# Patient Record
Sex: FEMALE | Race: WHITE | Employment: OTHER | URBAN - METROPOLITAN AREA
[De-identification: names, ages, dates, MRNs, and addresses within clinical notes are randomized per-mention and may not be internally consistent; named-entity substitution may affect disease eponyms.]

---

## 2017-03-18 PROBLEM — Z34.00 SUPERVISION OF NORMAL FIRST PREGNANCY, ANTEPARTUM: Status: ACTIVE | Noted: 2017-03-18

## 2017-03-18 PROBLEM — Z34.00 SUPERVISION OF NORMAL FIRST PREGNANCY, ANTEPARTUM (HCC): Status: ACTIVE | Noted: 2017-03-18

## 2017-03-21 PROCEDURE — 87591 N.GONORRHOEAE DNA AMP PROB: CPT | Performed by: OBSTETRICS & GYNECOLOGY

## 2017-03-21 PROCEDURE — 88175 CYTOPATH C/V AUTO FLUID REDO: CPT | Performed by: OBSTETRICS & GYNECOLOGY

## 2017-03-21 PROCEDURE — 87086 URINE CULTURE/COLONY COUNT: CPT | Performed by: OBSTETRICS & GYNECOLOGY

## 2017-03-21 PROCEDURE — 87491 CHLMYD TRACH DNA AMP PROBE: CPT | Performed by: OBSTETRICS & GYNECOLOGY

## 2017-03-23 PROCEDURE — 86762 RUBELLA ANTIBODY: CPT | Performed by: OBSTETRICS & GYNECOLOGY

## 2017-03-23 PROCEDURE — 86780 TREPONEMA PALLIDUM: CPT | Performed by: OBSTETRICS & GYNECOLOGY

## 2017-03-23 PROCEDURE — 36415 COLL VENOUS BLD VENIPUNCTURE: CPT | Performed by: OBSTETRICS & GYNECOLOGY

## 2017-03-23 PROCEDURE — 85025 COMPLETE CBC W/AUTO DIFF WBC: CPT | Performed by: OBSTETRICS & GYNECOLOGY

## 2017-03-23 PROCEDURE — 86850 RBC ANTIBODY SCREEN: CPT | Performed by: OBSTETRICS & GYNECOLOGY

## 2017-03-23 PROCEDURE — 87340 HEPATITIS B SURFACE AG IA: CPT | Performed by: OBSTETRICS & GYNECOLOGY

## 2017-03-23 PROCEDURE — 87389 HIV-1 AG W/HIV-1&-2 AB AG IA: CPT | Performed by: OBSTETRICS & GYNECOLOGY

## 2017-03-23 PROCEDURE — 86901 BLOOD TYPING SEROLOGIC RH(D): CPT | Performed by: OBSTETRICS & GYNECOLOGY

## 2017-03-23 PROCEDURE — 86900 BLOOD TYPING SEROLOGIC ABO: CPT | Performed by: OBSTETRICS & GYNECOLOGY

## 2017-04-17 PROBLEM — G43.701 CHRONIC MIGRAINE WITHOUT AURA WITH STATUS MIGRAINOSUS, NOT INTRACTABLE: Status: ACTIVE | Noted: 2017-04-17

## 2024-09-03 ENCOUNTER — APPOINTMENT (OUTPATIENT)
Dept: GENERAL RADIOLOGY | Age: 34
End: 2024-09-03
Attending: NURSE PRACTITIONER
Payer: COMMERCIAL

## 2024-09-03 ENCOUNTER — HOSPITAL ENCOUNTER (OUTPATIENT)
Age: 34
Discharge: HOME | End: 2024-09-03
Payer: COMMERCIAL

## 2024-09-03 VITALS
OXYGEN SATURATION: 100 % | RESPIRATION RATE: 18 BRPM | DIASTOLIC BLOOD PRESSURE: 89 MMHG | HEART RATE: 68 BPM | SYSTOLIC BLOOD PRESSURE: 132 MMHG | TEMPERATURE: 98 F

## 2024-09-03 DIAGNOSIS — M79.89 TOE SWELLING: Primary | ICD-10-CM

## 2024-09-03 PROCEDURE — 99203 OFFICE O/P NEW LOW 30 MIN: CPT | Performed by: NURSE PRACTITIONER

## 2024-09-03 PROCEDURE — 73660 X-RAY EXAM OF TOE(S): CPT | Performed by: NURSE PRACTITIONER

## 2024-09-03 RX ORDER — CEFADROXIL 500 MG/1
500 CAPSULE ORAL 2 TIMES DAILY
Qty: 14 CAPSULE | Refills: 0 | Status: SHIPPED | OUTPATIENT
Start: 2024-09-03 | End: 2024-09-10

## 2024-09-03 RX ORDER — SACCHAROMYCES BOULARDII 250 MG
250 CAPSULE ORAL 2 TIMES DAILY
Qty: 20 CAPSULE | Refills: 0 | Status: SHIPPED | OUTPATIENT
Start: 2024-09-03 | End: 2024-09-13

## 2024-09-03 NOTE — DISCHARGE INSTRUCTIONS
Take the full course of antibiotics, even if you start to feel better. Make an appointment to follow up with your primary care doctor or podiatrist in the next few days for a wound check. Use warm soaks/moist compresses and massage to drain area further. Good hand washing is important and anything that drains from the area should be considered contagious. Seek additional care in the emergency department if you have increasing redness, warmth or tenderness around your wound, fever > 100.4, nausea/vomiting or diarrhea, or for any other concerns.

## 2024-09-03 NOTE — ED INITIAL ASSESSMENT (HPI)
Pt c/o itchy bump with redness and swelling to bottom of L 3rd toe x3 days.  No pain.  [place]

## 2024-09-03 NOTE — ED PROVIDER NOTES
Patient Seen in: [facility]    History   CC: toe swelling  HPI: Leigh Franco 33 year old female  who presents c/o left 3rd toe swelling, redness and itching which first began 3 days ago. Denies known injury/trauma. States this occurred after [place]foot [place] - unsure if she stepped on something or if fb possible. Denies pain associated. States swelling has been worsening instead of improving. Denies dx, fever, rash, limitation in ROM. Denies foot or other toe involvement.     Past Medical History:    Gastrointestinal disorder    WAS TREATED FOR C. DIFFICILE    Genitourinary disease    HX UTIs    Headache    HX MIGRAINE HAs       Past Surgical History:   Procedure Laterality Date    Other  2000    plastic surgery in ears     Other surgical history  2000    PLASTIC SURGERY ON HER EARS       Family History   Problem Relation Age of Onset    Hypertension Father        Social History     Socioeconomic History    Marital status:    Tobacco Use    Smoking status: Never    Smokeless tobacco: Never   Vaping Use    Vaping status: Never Used   Substance and Sexual Activity    Alcohol use: No     Alcohol/week: 0.0 standard drinks of alcohol    Drug use: Never       ROS:  Review of Systems    Positive for stated complaint: toe problem  Other systems are as noted in HPI.  Constitutional and vital signs reviewed.      All other systems reviewed and negative except as noted above.    PSFH elements reviewed from today and agreed except as otherwise stated in HPI.             Constitutional and vital signs reviewed.        Physical Exam     ED Triage Vitals   BP 09/03/24 0839 132/89   Pulse 09/03/24 0839 68   Resp 09/03/24 0839 18   Temp 09/03/24 0839 97.7 °F (36.5 °C)   Temp src 09/03/24 0839 Temporal   SpO2 09/03/24 0839 100 %   O2 Device 09/03/24 0837 None (Room air)       Current:/89   Pulse 68   Temp 97.7 °F (36.5 °C) (Temporal)   Resp 18   LMP 08/15/2024 (Approximate)   SpO2 100%          PE:  General - Appears well, non-toxic and in NAD  Head - Appears symmetrical without deformity/swelling cranium, scalp, or facial bones  Skin - +diffuse mild edema noted to left 3rd toe with erythema noted to medial and plantar aspects. There is a punctate central area that could be consistent with insect bite/sting. No area of fluctuance or dx. Skin otherwise pink warm and dry throughout, mmm, cap refill <2seconds  Neuro - A&O x4, sensation equal to both medial and lateral aspects of lower extremities, steady gait  MSK - makes purposeful movements of lower extremities with full ROM noted, toe flexion/extension strength equal bilat, pedal pulses 2+ bilat.  Psych - Interactive and appropriate      ED Course   Labs Reviewed - No data to display    MDM     XR TOE(S) (MIN 2 VIEWS), LEFT 3RD (CPT=73660) (Final result)  Result time 09/03/24 08:58:21  Final result by Bryon Frazier MD (09/03/24 08:58:21)                Impression:    CONCLUSION:  1. No acute appearing fracture or dislocation.  Mild soft tissue swelling of the left 3rd toe at its mid to distal aspect.  Correlate clinically.           Dictated by (CST): Bryon Frazier MD on 9/03/2024 at 8:56 AM      Finalized by (CST): Bryon Frazier MD on 9/03/2024 at 8:58 AM                  Narrative:    PROCEDURE: XR TOE(S) (MIN 2 VIEWS), LEFT 3RD (CPT=73660)     COMPARISON: None.     INDICATIONS: Left 3rd toe swelling and redness without known injury/trauma x 3 days.  R/o fb.     TECHNIQUE: 3 views were obtained.       FINDINGS:  BONES: Normal. No significant arthropathy, fracture or acute abnormality.  SOFT TISSUES: Mild soft tissue swelling of the left 3rd toe at its mid to distal aspect.  Correlate clinically.  EFFUSION: None visible.  OTHER: Negative.              DDx: Inflammatory reaction from insect bite/sting, cellulitis, foreign body    X-ray results as noted above and independently reviewed by this provider.  Discussed with patient as well as likely  insect bite/sting localized inflammatory reaction as there is no pain associated however redness and swelling have progressed over the last 24 hours, therefore we will cover empirically for possible infectious process/cellulitis.  Antibiotic instructions, warm/moist compresses or soaks, follow-up and return/ED precautions reviewed.  Patient is historian and demonstrates understanding of all instruction and agrees with plan of care.      Disposition and Plan     Clinical Impression:  1. Toe swelling        Disposition:  Discharge    Follow-up:  Chuyita Shah, ALEXSANDRA  [address] [address]  [phone]    Go in 3 days  As needed      Medications Prescribed:  Discharge Medication List as of 9/3/2024  9:06 AM        START taking these medications    Details   cefadroxil 500 MG Oral Cap Take 1 capsule (500 mg total) by mouth 2 (two) times daily for 7 days., Normal, Disp-14 capsule, R-0      saccharomyces boulardii (FLORASTOR) 250 MG Oral Cap Take 1 capsule (250 mg total) by mouth 2 (two) times daily for 10 days., Normal, Disp-20 capsule, R-0